# Patient Record
Sex: FEMALE
[De-identification: names, ages, dates, MRNs, and addresses within clinical notes are randomized per-mention and may not be internally consistent; named-entity substitution may affect disease eponyms.]

---

## 2020-04-17 ENCOUNTER — HOSPITAL ENCOUNTER (OUTPATIENT)
Dept: HOSPITAL 92 - L&D/OP | Age: 25
LOS: 1 days | Discharge: HOME | End: 2020-04-18
Attending: OBSTETRICS & GYNECOLOGY
Payer: SELF-PAY

## 2020-04-17 DIAGNOSIS — O99.283: ICD-10-CM

## 2020-04-17 DIAGNOSIS — O36.8130: Primary | ICD-10-CM

## 2020-04-17 DIAGNOSIS — O24.415: ICD-10-CM

## 2020-04-17 DIAGNOSIS — E03.9: ICD-10-CM

## 2020-04-17 DIAGNOSIS — Z3A.37: ICD-10-CM

## 2020-04-17 DIAGNOSIS — Z79.899: ICD-10-CM

## 2020-04-18 NOTE — PDOC.FPROB
FMR OB H&P: HPI





- History of Present Illness


Chief Complaint: Decreased fetal movement


History of Present Illness: 


Pt is a 26 yo F  at 37.4 wks by LMP c/w 8.5 wk US who presents for 

decreased fetal movement. She says she has been feeling baby less since Tuesday 

of this week. She feels baby move in the mornings and evening, but little 

throughout the day. She says baby did not move at all tonight and so she got 

concerned and came in to be checked. She denies any LOF, contractions, bleeding

, or vaginal discharge.





She was seen for an US where the they found her to have a normal NST. She was 

told to come to the hospital if she felt like she had continued decrease in 

fetal movement.


Primary Care Physician: 


DARRYL Rehman





FMR OB H&P: Current Pregnancy





- Prenatal Care


: 3


Para: 


Gestational age: 37.4


Due date: 2020


Dating Criteria: LMP c/w with 8.5 wk US


Course/Complications: 


Gestational Diabetes





- OB Labs


Blood type: O


RH: positive


Antibody Screen: negative


HIV: negative


RPR: negative


HepBsAg: negative


Rubella: immune


Quad screen: negative


Gonorrhea: negative


Chlamydia: negative


3 hour GTT: 111, 211, 170


A1c: 5.3


H&H: 11.9/34.5


Platelets: 223





FMR OB H&P: History





- Past Medical History


PMH: 


Hypothyroidism





- OB History


OB History: 


Gestational Diabetes treated with metformin.


Hx of gestational hypertension in previous pregnancy.


2 prior pregnancies by  @ term- 1 M, 1 F





- GYN History


GYN History: 


NILM Pap 





- Surgical History


Sx History: 


None





- Social History


Social History: 


No alcohol, tobacco, or recreational drugs.





- Family History


Family History: 


Cousin with autism





FMR OB H&P: Medications





- Current


Home Medications: 


 











 Medication  Instructions  Recorded  Confirmed  Type


 


Prenatal Vitamin 1 tablet PO DAILY 06/29/15 07/19/15 History


 


Levothyroxine Sodium [Synthroid] 1 tab PO DAILY 20 History


 


metFORMIN [Glucophage] 2 tab PO DAILY 20 History











Allergies/Adverse Reactions: 


 Allergies











Allergy/AdvReac Type Severity Reaction Status Date / Time


 


No Known Allergies Allergy   Verified 06/29/15 22:32














FMR OB H&P: ROS





- Review of Systems


General: denies: fever/chills


Eyes: denies: vision changes


ENT: denies: nasal congestion, rhinorrhea, sore throat


Cardiovascular: denies: chest pain, palpitation, edema


Respiratory: denies: cough, congestion, shortness of breath


Gastrointestinal: reports: nausea.  denies: abdominal pain, cramping, vomiting, 

diarrhea


Genitourinary (Female): denies: dysuria, vaginal discharge, vaginal bleeding, 

contractions


Musculoskeletal: denies: pain, tenderness, swelling


Neurologic: reports: other (Dizziness that resolved spontaneously).  denies: 

numbness, weakness


Integumentary: denies: itching, rash, lesions


Hematologic/Lymphatic: denies: prolonged or excessive bleeding, enlarged lymph 

nodes





FMR OB H&P: Vital Signs





- Maternal


Vital signs: 


BP: 119/64, HR: 80, O2: 99% on RA, T: 





- Fetal Heart Tones


Variability: moderate


Acceleration: present


Deceleration: absent


Category: category 1





FMR OB H&P: Physical Exam





- Physical Exam


General: NAD


HEENT: normocephalic and atraumatic, PERRLA, EOMI, MMM, conjunctiva clear, no 

scleral icterus, normal nasal mucosa, oropharynx clear


Neck: supple, trachea midline, no LAD


Heart: RRR, normal S1/S2, no murmurs/rubs/gallops, pulses present, no edema


General: CTAB, no respiratory distress, good air movement, no rales/rhonchi, no 

wheezing, no retractions


Abdomen: soft, gravid, non-tender, bowel sound present


Musculoskeletal: normal gait and station, pulses present, FROM in all four 

extremities


Neurological: cranial nerves II through XII intact, no focal deficit


Skin: no rash, good tugor


Lymphatic: no unusual bruising or bleeding, no purpura, no petechia, no LAD


Psychiatric: normal mood and affect





FMR OB H&P: A/P





- Problem List


(1) Term pregnancy


Status: Acute   Code(s): Z34.90 - ENCNTR FOR SUPRVSN OF NORMAL PREGNANCY, UNSP, 

UNSP TRIMESTER   





(2) Decreased fetal movement


Status: Acute   Code(s): O36.8190 - DECREASED FETAL MOVEMENTS, UNSP TRIMESTER, 

UNSP   


Disposition: 


Pt is a 26 yo F  at 37.4 wks by LMP c/w 8.5 wk US who presents for 

decreased fetal movement.





1. Decreased Fetal Movement


Pt was evaluated on NST and was found to be reactive


* Discussed with patient return precautions and if she has concerns to return 

to the hospital for evaluation.





2. Term Pregnancy


37.4 wks


* Hx of gestational HTN with past pregnancy and took medication for a year after


 * BP well controlled here


* Hypothyroidism on levothyroxine that is well controlled throughout pregnancy


* A2GDM currently on metformin


* GBS collected but not in records





Dispo: Discharged home. Discussed return precautions. Recommend following up at 

[facility] at scheduled visit on Thursday of next week.





Discussion: 


Date/Time: 20 0043











This H&P was discussed with  [] and  [] who agree with the above 

documentation and plan.








Addendum - Attending





- Attending Attestation


Date/Time: 20 2622





I personally evaluated the patient and discussed the management with Dr. Oneal.


I agree with the History, Examination, Assessment and Plan documented above.

## 2020-04-29 ENCOUNTER — HOSPITAL ENCOUNTER (INPATIENT)
Dept: HOSPITAL 92 - L&D | Age: 25
LOS: 2 days | Discharge: HOME | End: 2020-05-01
Attending: FAMILY MEDICINE | Admitting: FAMILY MEDICINE
Payer: SELF-PAY

## 2020-04-29 VITALS — BODY MASS INDEX: 46.1 KG/M2

## 2020-04-29 DIAGNOSIS — Z3A.39: ICD-10-CM

## 2020-04-29 DIAGNOSIS — Z79.899: ICD-10-CM

## 2020-04-29 DIAGNOSIS — E66.9: ICD-10-CM

## 2020-04-29 DIAGNOSIS — Z79.890: ICD-10-CM

## 2020-04-29 DIAGNOSIS — Z86.19: ICD-10-CM

## 2020-04-29 DIAGNOSIS — Z87.440: ICD-10-CM

## 2020-04-29 DIAGNOSIS — E03.9: ICD-10-CM

## 2020-04-29 LAB
HBSAG INDEX: 0.14 S/CO (ref 0–0.99)
HGB BLD-MCNC: 13.7 G/DL (ref 12–16)
MCH RBC QN AUTO: 32.8 PG (ref 27–31)
MCV RBC AUTO: 94.6 FL (ref 78–98)
PLATELET # BLD AUTO: 236 THOU/UL (ref 130–400)
RBC # BLD AUTO: 4.18 MILL/UL (ref 4.2–5.4)
SYPHILIS ANTIBODY INDEX: 0.1 S/CO
WBC # BLD AUTO: 8.9 THOU/UL (ref 4.8–10.8)

## 2020-04-29 PROCEDURE — 36415 COLL VENOUS BLD VENIPUNCTURE: CPT

## 2020-04-29 PROCEDURE — 87340 HEPATITIS B SURFACE AG IA: CPT

## 2020-04-29 PROCEDURE — 86850 RBC ANTIBODY SCREEN: CPT

## 2020-04-29 PROCEDURE — 86901 BLOOD TYPING SEROLOGIC RH(D): CPT

## 2020-04-29 PROCEDURE — 85027 COMPLETE CBC AUTOMATED: CPT

## 2020-04-29 PROCEDURE — 3E0P7VZ INTRODUCTION OF HORMONE INTO FEMALE REPRODUCTIVE, VIA NATURAL OR ARTIFICIAL OPENING: ICD-10-PCS | Performed by: FAMILY MEDICINE

## 2020-04-29 PROCEDURE — 86780 TREPONEMA PALLIDUM: CPT

## 2020-04-29 PROCEDURE — 86900 BLOOD TYPING SEROLOGIC ABO: CPT

## 2020-04-29 PROCEDURE — 36416 COLLJ CAPILLARY BLOOD SPEC: CPT

## 2020-04-29 NOTE — PDOC.FPROB
FMR OB H&P: HPI





- History of Present Illness


Chief Complaint: IOL


Indentification: 


History of Present Illness: 


Patient is a 26 y/o   female at 39.1W EGA by LMP, c/w 9.2W US, 

who presents to L&D for induction of labor. Patient reports no problems at this 

time other that some mild nausea and back pain, and states that her pregnancy 

has only been complicated by 1 episode of UTI and 1 episode of vaginal 

candidiasis. Records were reviewed from [facility], which demonstrated that patient 

has had adequate prenatal care and appropriate supervision of Hypothyroidism 

and A2GDM. Patient states that she checks her POC Glucose regularly at home, 

and upon review her POC Glucose readings were found to range from the high 90s 

to the low 110s. 





Patient denies any CTX, loss of fluid, vaginal bleeding, loss of fetal movements

, fevers, chills, headaches, visual disturbances, auditory disturbances, 

vomiting, CP, peripheral edema, SOB, cough, RUQ pain, dysuria, hematuria, 

bloody stools, arthralgias/arthritis, or recent falls/trauma. Patient denies 

sick contacts and recent travel.





Patient's  was present in the room at the time of evaluation.


Primary Care Physician: 


[facility] - Dr. Rachel Rehman / Dr. Jeanette Garcia





FMR OB H&P: Current Pregnancy





- Prenatal Care


: 3


Para: 


Gestational age: 39.1


Due date: 20


Dating Criteria: See HPI


Course/Complications: 


See HPI





- OB Labs


Blood type: O


RH: positive


Antibody Screen: negative


HIV: negative


RPR: negative


HepBsAg: negative


Rubella: immune


Urine drug screen: not done


Gonorrhea: negative


Chlamydia: negative


A1c: 5.3


GBS: negative


H&H: Pending


Platelets: Pending





FMR OB H&P: History





- Past Medical History


PMH: 


Hypothyroidism, A2GDM, Obesity





- OB History


OB History: 


 x2 - no complications noted





- GYN History


GYN History: 


Non-contributory.





- Surgical History


Sx History: 


None.





- Social History


Social History: 


Denies x3. Patient is unemployed at this time.





- Family History


Family History: 


Non-contributory.





FMR OB H&P: Medications





- Current


Home Medications: 


 











 Medication  Instructions  Recorded  Confirmed  Type


 


Prenatal Vitamin 1 tablet PO DAILY 06/29/15 04/29/20 History


 


Levothyroxine Sodium [Synthroid] 1 tab PO DAILY 20 History


 


metFORMIN [Glucophage] 2 tab PO DAILY 20 History











Allergies/Adverse Reactions: 


 Allergies











Allergy/AdvReac Type Severity Reaction Status Date / Time


 


No Known Allergies Allergy   Verified 06/29/15 22:32














FMR OB H&P: ROS





- Review of Systems


General: reports: fatigue.  denies: fever/chills, recent trauma


Eyes: denies: vision changes


ENT: denies: sore throat


Cardiovascular: denies: chest pain, edema


Respiratory: denies: cough, shortness of breath


Gastrointestinal: reports: nausea.  denies: abdominal pain, vomiting, diarrhea, 

constipation, bright red blood, dark black tarry stools


Genitourinary (Female): reports: vaginal discharge.  denies: dysuria, hematuria

, vaginal pain, vaginal bleeding, contractions


Musculoskeletal: denies: arthritis/arthralgias


Neurologic: denies: headache





FMR OB H&P: Vital Signs





- Maternal


Vital signs: 


HR (85), BP (126/74), T(98.5), 02Sat(98% - Room Air)





- Fetal Heart Tones


Baseline: 140


Variability: moderate


Acceleration: present


Deceleration: variable (x1)





FMR OB H&P: Physical Exam





- Physical Exam


General: NAD, awake, alert and oriented


HEENT: normocephalic and atraumatic, PERRLA, EOMI, conjunctiva clear, no 

scleral icterus, grossly normal vision, grossly normal hearing


Neck: supple, FROM, trachea midline, no LAD


Chest: non-tender to palpation, no lesions


Breast: symmetric


Heart: RRR, normal S1/S2, no murmurs/rubs/gallops, pulses present, no edema


General: CTAB, no respiratory distress, good air movement, no rales/rhonchi, no 

wheezing, no retractions


Abdomen: fundus(cm), non-tender


Musculoskeletal: pulses present, FROM in all four extremities, no misalignment/

asymmetry, no atrophy


Neurological: sensation to pain,touch and proprioception grossly normal


Skin: no rash, good tugor, no jaundice


Lymphatic: no unusual bruising or bleeding, no purpura, no petechia, no LAD


Psychiatric: intact recent and remote memory, good judgement and insight, 

normal mood and affect





- Pelvic Exam


Vulva: no masses, no lesions


Cervix: no masses, no blood


SVE: 2.5 / 50 / High


Piper score: 5


Membranes: Intact


Fetal Presentation: Vertex





FMR OB H&P: A/P





- Problem List


(1) Gestational diabetes


Current Visit: Yes   Status: Acute   Code(s): O24.419 - GESTATIONAL DIABETES 

MELLITUS IN PREGNANCY, UNSP CONTROL   





(2) Hypothyroidism


Current Visit: Yes   Status: Acute   Code(s): E03.9 - HYPOTHYROIDISM, 

UNSPECIFIED   





(3) Obesity


Current Visit: Yes   Status: Acute   Code(s): E66.9 - OBESITY, UNSPECIFIED   





(4) Term pregnancy


Current Visit: No   Status: Acute   Code(s): Z34.90 - ENCNTR FOR SUPRVSN OF 

NORMAL PREGNANCY, UNSP, UNSP TRIMESTER   


Disposition: 


Patient is a 26 y/o   female at 39.1W EGA by LMP, c/w 1st 

Trimester US, who presents to L&D for IOL.





1. SIUP


-Patient does not endorse loss of fluids, vaginal bleeding, CTX or loss of 

fetal movement at this time


-Pregnancy complicated by 1 UTI and 1 episodes of Vaginal Candiasis - no  

complaints at this time


-GBS: Negative


-HIV / RPR / GC / CT: Negative


-Maternal VSS w/ unremarkable physical exam


-FHTs in the 140s w/ Acels present, 1 Variable Decel noted during intake - will 

continue to monitor


-SVE(): 2.5 / 50 / High


-Piper Score: 5


-Will plan for Cytotec Induction w/ Q4H SVEs


-LR @ 125 ml/hr


-No epidural desired - ensure adequate pain control





2. A2GDM


-HgA1c: 5.3


-POC Glucose appears to be WNL w/ home readings


-Will hold Metformin 500 mg PO BID at this time


-Q2H Accuchecks


-Hypoglycemia Protocol





3. Hypothyroidism


-Adequate monitoring throughout pregnancy


-TSH: 4.0 > 2.3 > 1.72 > 3.18


-Patient does not endorse symptoms consistent w/ Hypo- / Hyperthyroidism


-Will continue home Levothyroxine regimen





4. Obesity


-Most recent US (34.3W) reviewed prior to admission


-HADLOCK: 78%


-EFW: 2456 g


-Low concern for Fetal Macrosomia at this time





PCP: [facility] - Dr. Rehman / Dr. Garcia





Dispo: Patient is currently stable and admitted to L&D for IOL at 39.1W EGA. 

Plan for Cytotec Induction w/ Q4H SVEs. Monitor Q2H Accuchecks and ensure 

adequate pain control. Expected LOS > 48H.


Discussion: 


Date/Time: 20











This H&P was discussed with  [] and  [] who agree with the above 

documentation and plan.








Addendum - Attending





- Attending Attestation


Date/Time: 20





I personally evaluated the patient and discussed the management with Dr. Reddy at time of admission.


I agree with the History, Examination, Assessment and Plan documented above 

with any addition or exceptions noted below.

## 2020-04-30 PROCEDURE — 0HQ9XZZ REPAIR PERINEUM SKIN, EXTERNAL APPROACH: ICD-10-PCS | Performed by: FAMILY MEDICINE

## 2020-04-30 PROCEDURE — 10907ZC DRAINAGE OF AMNIOTIC FLUID, THERAPEUTIC FROM PRODUCTS OF CONCEPTION, VIA NATURAL OR ARTIFICIAL OPENING: ICD-10-PCS | Performed by: FAMILY MEDICINE

## 2020-04-30 RX ADMIN — DOCUSATE CALCIUM SCH MG: 240 CAPSULE, LIQUID FILLED ORAL at 21:15

## 2020-04-30 RX ADMIN — Medication ONE MLS: at 10:18

## 2020-04-30 NOTE — PDOC.OBLPN
FMR OB Labor PN: Subj





- Interval History


Hospital Day: 1


Chief Complaint: IOL


Indentification:  @ 39.2W EGA by LMP, c/w 1st Trimester US


Interval History: Increasingly painful CTX





FMR OB Labor PN: Obj





- Maternal


Vital signs: 


BP: [123/72]  HR: [63] RR: [] Tmax: [] Pox: []% on []  Wt: []   








FMR OB Labor PN: Exam





- Physical Exam


General: NAD, awake, alert and oriented, other (Increasingly painful CTX)


HEENT: normocephalic and atraumatic, EOMI, MMM, conjunctiva clear, no scleral 

icterus, grossly normal vision, grossly normal hearing


Neck: supple, FROM


Breast: symmetric


Heart: no edema


General: no respiratory distress


Abdomen: gravid, non-tender


Musculoskeletal: FROM in all four extremities, no misalignment/asymmetry, no 

atrophy


Neurological: sensation to pain,touch and proprioception grossly normal


Skin: no rash, no jaundice


Lymphatic: no unusual bruising or bleeding, no purpura, no petechia


Psychiatric: intact recent and remote memory, good judgement and insight, 

normal mood and affect





- Pelvic Exam


Cervix: no masses, no blood


SVE: 4/50/-2


Piper score: 7


Membranes: Intact


Fetal Presentation: Vertex





FMR OB Labor PN: Data





- Labs


Lab results: 


 Laboratory Results - last 24 hr











  20





  20:54 21:09 21:09


 


WBC   


 


RBC   


 


Hgb   


 


Hct   


 


MCV   


 


MCH   


 


MCHC   


 


RDW   


 


Plt Count   


 


MPV   


 


POC Glucose  96  


 


Syphilis IgG/IgM Ab    Nonreactive


 


Hep Bs Antigen   Non-Reactive 


 


Blood Type   


 


Antibody Screen   














  20





  21:09 21:09 00:17


 


WBC   8.9 


 


RBC   4.18 L 


 


Hgb   13.7 


 


Hct   39.6 


 


MCV   94.6 


 


MCH   32.8 H 


 


MCHC   34.7 


 


RDW   12.7 


 


Plt Count   236 


 


MPV   9.1 


 


POC Glucose    84


 


Syphilis IgG/IgM Ab   


 


Hep Bs Antigen   


 


Blood Type  O POSITIVE  


 


Antibody Screen  NEGATIVE  














  20





  04:11


 


WBC 


 


RBC 


 


Hgb 


 


Hct 


 


MCV 


 


MCH 


 


MCHC 


 


RDW 


 


Plt Count 


 


MPV 


 


POC Glucose  81


 


Syphilis IgG/IgM Ab 


 


Hep Bs Antigen 


 


Blood Type 


 


Antibody Screen 














FMR OB Labor PN: A/P





- Problem List


(1) Gestational diabetes


Current Visit: Yes   Status: Acute   Code(s): O24.419 - GESTATIONAL DIABETES 

MELLITUS IN PREGNANCY, UNSP CONTROL   





(2) Hypothyroidism


Current Visit: Yes   Status: Acute   Code(s): E03.9 - HYPOTHYROIDISM, 

UNSPECIFIED   





(3) Obesity


Current Visit: Yes   Status: Acute   Code(s): E66.9 - OBESITY, UNSPECIFIED   





(4) Term pregnancy


Current Visit: No   Status: Acute   Code(s): Z34.90 - ENCNTR FOR SUPRVSN OF 

NORMAL PREGNANCY, UNSP, UNSP TRIMESTER   


Disposition: 


Patient is a 26 y/o   female at 39.2W EGA by LMP, c/w 1st 

Trimester US, who presents to L&D for IOL.





***0345 SVE***





1. SIUP


-Patient does not endorse loss of fluids, vaginal bleeding, CTX or loss of 

fetal movement at this time


-Pregnancy complicated by 1 UTI and 1 episodes of Vaginal Candiasis - no  

complaints at this time


-GBS: Negative


-HIV / RPR / GC / CT: Negative


-Maternal VSS w/ unremarkable physical exam


-FHTs in the 130s w/ Acels present, 1 Variable Decel noted during intake - will 

continue to monitor


-SVE(): 2.5 / 50 / High


-Cytotec #1 placed at 2115


-SVE(0115): 3 / 50 / -3


-SVE(034): 4/ 50 / -2


-Piper Score: 7


-Will repeat SVE in 4H to assess for Cytotec #2 Placement


-LR @ 125 ml/hr


-No epidural desired - will administer Stadol 2 mg IVP x1 at this time





2. A2GDM


-HgA1c: 5.3


-POC Glucose appears to be WNL w/ home readings


-Will hold Metformin 500 mg PO BID at this time


-Q2H Accuchecks - all values WNL since admission


-Hypoglycemia Protocol





3. Hypothyroidism


-Adequate monitoring throughout pregnancy


-TSH: 4.0 > 2.3 > 1.72 > 3.18


-Patient does not endorse symptoms consistent w/ Hypo- / Hyperthyroidism


-Will continue home Levothyroxine regimen





4. Obesity


-Most recent US (34.3W) reviewed prior to admission


-HADLOCK: 78%


-EFW: 2456 g


-Low concern for Fetal Macrosomia at this time





PCP: [facility] - Dr. Rehman / Dr. Garcia





Dispo: Patient is currently stable and admitted to L&D for IOL at 39.1W EGA. 

Continue with Cytotec IOL and plan for next SVE in 4H. Monitor Q2H Accuchecks 

and ensure adequate pain control as per above. Expected LOS > 48H.


Discussion: 


Date/Time: 20 0436











This H&P was discussed with  [] and  [] who agree with the above 

documentation and plan.

## 2020-04-30 NOTE — PDOC.OBLPN
FMR OB Labor PN: Subj





- Interval History


Hospital Day: 1


Chief Complaint: IOL


Indentification:  @ 39.2W EGA by LMP, c/w 1st Trimester US


Interval History: None





FMR OB Labor PN: Obj





- Maternal


Vital signs: 


BP: [129/63]  HR: [63] RR: [] Tmax: [] Pox: []% on []  Wt: []   








FMR OB Labor PN: Exam





- Physical Exam


General: NAD, awake, alert and oriented


HEENT: normocephalic and atraumatic, EOMI, MMM, conjunctiva clear, no scleral 

icterus, grossly normal vision, grossly normal hearing


Neck: supple, FROM


Breast: symmetric


Abdomen: gravid, non-tender


Musculoskeletal: FROM in all four extremities, no misalignment/asymmetry, no 

atrophy


Neurological: sensation to pain,touch and proprioception grossly normal


Skin: no rash, no jaundice


Lymphatic: no unusual bruising or bleeding, no purpura, no petechia


Psychiatric: intact recent and remote memory, good judgement and insight, 

normal mood and affect





- Pelvic Exam


SVE: 3/50/-3


Piper score: 5


Membranes: Intact


Fetal Presentation: Vertex





FMR OB Labor PN: Data





- Labs


Lab results: 


 Laboratory Results - last 24 hr











  20





  20:54 21:09 21:09


 


WBC   


 


RBC   


 


Hgb   


 


Hct   


 


MCV   


 


MCH   


 


MCHC   


 


RDW   


 


Plt Count   


 


MPV   


 


POC Glucose  96  


 


Syphilis IgG/IgM Ab    Nonreactive


 


Hep Bs Antigen   Non-Reactive 


 


Blood Type   


 


Antibody Screen   














  20





  21:09 21:09 00:17


 


WBC   8.9 


 


RBC   4.18 L 


 


Hgb   13.7 


 


Hct   39.6 


 


MCV   94.6 


 


MCH   32.8 H 


 


MCHC   34.7 


 


RDW   12.7 


 


Plt Count   236 


 


MPV   9.1 


 


POC Glucose    84


 


Syphilis IgG/IgM Ab   


 


Hep Bs Antigen   


 


Blood Type  O POSITIVE  


 


Antibody Screen  NEGATIVE  














FMR OB Labor PN: A/P





- Problem List


(1) Gestational diabetes


Current Visit: Yes   Status: Acute   Code(s): O24.419 - GESTATIONAL DIABETES 

MELLITUS IN PREGNANCY, UNSP CONTROL   





(2) Hypothyroidism


Current Visit: Yes   Status: Acute   Code(s): E03.9 - HYPOTHYROIDISM, 

UNSPECIFIED   





(3) Obesity


Current Visit: Yes   Status: Acute   Code(s): E66.9 - OBESITY, UNSPECIFIED   





(4) Term pregnancy


Current Visit: No   Status: Acute   Code(s): Z34.90 - ENCNTR FOR SUPRVSN OF 

NORMAL PREGNANCY, UNSP, UNSP TRIMESTER   


Disposition: 


Patient is a 24 y/o   female at 39.1W EGA by LMP, c/w 1st 

Trimester US, who presents to L&D for IOL.





***0115 SVE***





1. SIUP


-Patient does not endorse loss of fluids, vaginal bleeding, CTX or loss of 

fetal movement at this time


-Pregnancy complicated by 1 UTI and 1 episodes of Vaginal Candiasis - no  

complaints at this time


-GBS: Negative


-HIV / RPR / GC / CT: Negative


-Maternal VSS w/ unremarkable physical exam


-FHTs in the 140s w/ Acels present, 1 Variable Decel noted during intake - will 

continue to monitor


-SVE(): 2.5 / 50 / High


-Cytotec #1 placed at 


-SVE(115): 3 / 50 / -3


-Piper Score: 5


-Will repeat SVE in 2H to assess for Cytotec # Placement


-LR @ 125 ml/hr


-No epidural desired - ensure adequate pain control





2. A2GDM


-HgA1c: 5.3


-POC Glucose appears to be WNL w/ home readings


-Will hold Metformin 500 mg PO BID at this time


-Q2H Accuchecks - all values WNL since admission


-Hypoglycemia Protocol





3. Hypothyroidism


-Adequate monitoring throughout pregnancy


-TSH: 4.0 > 2.3 > 1.72 > 3.18


-Patient does not endorse symptoms consistent w/ Hypo- / Hyperthyroidism


-Will continue home Levothyroxine regimen





4. Obesity


-Most recent US (34.3W) reviewed prior to admission


-HADLOCK: 78%


-EFW: 2456 g


-Low concern for Fetal Macrosomia at this time





PCP: [facility] - Dr. Rehman / Dr. Garcia





Dispo: Patient is currently stable and admitted to L&D for IOL at 39.1W EGA. 

Continue with Cytotec IOL and plan for additional SVE in 2H. Monitor Q2H 

Accuchecks and ensure adequate pain control as per above. Expected LOS > 48H.


Discussion: 


Date/Time: 20 3304











This H&P was discussed with  [] and  [] who agree with the above 

documentation and plan.

## 2020-04-30 NOTE — PDOC.OPDEL
OB Operative/Delivery Note





- Additional Findings/Plan


Compilations/Other Findings: 





Vaginal Delivery Dictation Guideline 


Delivering Physician: Dr. Rachel Rehman, Dr. Marilia Almaraz


Attending: Dr. Yemi Coppola


Procedure: Spontaneous Vaginal Delivery 


Anesthesia: epidural, Local for Repair 


EBL: 175ml ml 


Pre-op Diagnosis: 


1. Medical induction of term intrauterine pregnancy for A2GDM 


2. A2GDM on metformin


3. Hx of Hypothyroidism 


Post-op Diagnosis: 


1. Term intrauterine pregnancy, delivered 


2. same as above 


Indications: A 26y/o female  presents to L&D for induction due to A2GDM 


Delivery Note: This is 26yo F  @ 39.2wks who delivered a viable M infant 

at 0859. Following an uneventful intrapartum course, that included q2h 

accuchecks all wnl, and AROM with fetal scalp electrode placement ~30min prior 

to delivery, a vigorous male was delivered over an intact perineum in the 

occipitoanterior position. Anterior Shoulder and then remainder of the body 

delivered. Nuchal cord x 1. The head was held down and mouth and nares were 

bulb suctioned. Cord clamped and cut and cord blood collected. Placenta 

delivered intact in the Ngo presentation with a 3 vessel cord noted. Fundal 

massage was performed and the fundus was firm. The cervix and vagina were 

inspected and a 1st degree laceration noted and repaired with 3-0 vicryl in the 

usual fashion with good approximation and hemostasis after 10ml lidocaine was 

injected at site. Infant went to  nursery in good condition for routine 

care. Apgars were 8/9 at 1 & 5 minutes, respectively. Patient tolerated 

delivery well and went to postpartum after routine recovery/care. 





Addendum - Attending





- Attending Attestation


Date/Time: 20 1027





I was present for the delivery.

## 2020-05-01 VITALS — DIASTOLIC BLOOD PRESSURE: 66 MMHG | TEMPERATURE: 97.9 F | SYSTOLIC BLOOD PRESSURE: 112 MMHG

## 2020-05-01 LAB
HGB BLD-MCNC: 12.6 G/DL (ref 12–16)
MCH RBC QN AUTO: 32.3 PG (ref 27–31)
MCV RBC AUTO: 95.9 FL (ref 78–98)
PLATELET # BLD AUTO: 198 THOU/UL (ref 130–400)
RBC # BLD AUTO: 3.89 MILL/UL (ref 4.2–5.4)
WBC # BLD AUTO: 9.3 THOU/UL (ref 4.8–10.8)

## 2020-05-01 RX ADMIN — DOCUSATE CALCIUM SCH MG: 240 CAPSULE, LIQUID FILLED ORAL at 09:02

## 2020-05-01 NOTE — [email or web address].OBPPN
FMR OB Postpartum PN: Subj





- Interval History


Hospital Day: 2


Postpartum Day: 





1


Chief Complaint: none


Indentification: 25F >3 delivered TIFFANY SANCHEZ via  @ 39.2wga on  @ 0859


Interval History: VSS, tolerating PO, ambulating, voiding/passing flatus





FMR OB Postpartum PN: Obj





- Maternal


Vital signs: 


BP: [106-136/64-74]  HR: [62-74] RR: [16] Tmax: [98.6F] Pox: [99]% on [RA]  Wt: 

[129.727kg]   








- Urine output


I&O: 


 











 20





 06:59 06:59 06:59


 


Output Total   250


 


Balance   -250














- Lochia


Lochia: 





mild





- Pain Management


Intervention: oral medication





FMR OB Postpartum PN: Exam





- Physical Exam


General: NAD, awake, alert and oriented


HEENT: normocephalic and atraumatic, MMM


Neck: supple, FROM


Chest: non-tender to palpation, no lesions


Heart: RRR, normal S1/S2


General: CTAB, no respiratory distress


Abdomen: soft, bowel sound present, no masses


Musculoskeletal: pulses present, FROM in all four extremities


Neurological: no tremor, no focal deficit


Skin: no rash, good tugor


Postpartum: no edema, appropriately tender


Lymphatic: no unusual bruising or bleeding, no purpura


Psychiatric: intact recent and remote memory, good judgement and insight





FMR OB Postpartum PN: Data





- Labs


Lab results: 


 Laboratory Results - last 24 hr











  20





  07:22 05:00


 


WBC   9.3


 


RBC   3.89 L


 


Hgb   12.6


 


Hct   37.3


 


MCV   95.9


 


MCH   32.3 H


 


MCHC   33.7


 


RDW   12.6


 


Plt Count   198


 


MPV   8.3


 


POC Glucose  82 














FMR OB Postpartum PN: A/P





- Problem List


(1) Postpartum care and examination


Current Visit: Yes   Status: Acute   Code(s): Z39.2 - ENCOUNTER FOR ROUTINE 

POSTPARTUM FOLLOW-UP   





(2) Gestational diabetes


Current Visit: Yes   Status: Resolved   Code(s): O24.419 - GESTATIONAL DIABETES 

MELLITUS IN PREGNANCY, UNSP CONTROL   





(3) Hypothyroidism


Current Visit: Yes   Status: Chronic   Code(s): E03.9 - HYPOTHYROIDISM, 

UNSPECIFIED   


Disposition: 





Patient is a 25F  @ 39.2wga that delivered a TAGA LAURA via  on  @ 

0859





#Postpartum Day 1


-mild lochia


-passing flatus, has had a BM since delivery


-reporting mild vaginal pain, abdomen slightly ttp


-tolerating PO


-ambulating


-plans to f/u at [facility] @ 2 and 6wks


-eager to go home to her other children





#Hx of A2GDM


-hold metformin


-stop accuchecks


-encourage patient to have glucose testing at 6wk pp visit





Dispo: post-op day 1 status post  @ 0859 on . Possible d/c today pending 

baby's bili


Discussion: 


Date/Time: 20 0604








Signature: 





Marilia Almaraz MD, PGY-1





Addendum - Attending





- Attending Attestation


Date/Time: 20 1006





I personally evaluated the patient and discussed the management with Dr. Almaraz.


I agree with the History, Examination, Assessment and Plan documented above 

with any addition or exceptions noted below.

## 2021-12-13 ENCOUNTER — HOSPITAL ENCOUNTER (EMERGENCY)
Dept: HOSPITAL 92 - CSHERS | Age: 26
LOS: 1 days | Discharge: HOME | End: 2021-12-14
Payer: SELF-PAY

## 2021-12-13 DIAGNOSIS — Z3A.01: ICD-10-CM

## 2021-12-13 DIAGNOSIS — O20.0: Primary | ICD-10-CM

## 2021-12-13 DIAGNOSIS — O10.011: ICD-10-CM

## 2021-12-13 PROCEDURE — 81003 URINALYSIS AUTO W/O SCOPE: CPT

## 2021-12-13 PROCEDURE — 84702 CHORIONIC GONADOTROPIN TEST: CPT

## 2021-12-13 PROCEDURE — 76856 US EXAM PELVIC COMPLETE: CPT

## 2021-12-13 PROCEDURE — 86901 BLOOD TYPING SEROLOGIC RH(D): CPT

## 2021-12-13 PROCEDURE — 81015 MICROSCOPIC EXAM OF URINE: CPT

## 2021-12-13 PROCEDURE — 85025 COMPLETE CBC W/AUTO DIFF WBC: CPT

## 2021-12-13 PROCEDURE — 86900 BLOOD TYPING SEROLOGIC ABO: CPT

## 2021-12-14 LAB
BASOPHILS # BLD AUTO: 0 10X3/UL (ref 0–0.2)
BASOPHILS NFR BLD AUTO: 0.4 % (ref 0–2)
EOSINOPHIL # BLD AUTO: 0.2 10X3/UL (ref 0–0.5)
EOSINOPHIL NFR BLD AUTO: 1.5 % (ref 0–6)
GLUCOSE UR STRIP-MCNC: >=1000 MG/DL
HGB BLD-MCNC: 13.8 G/DL (ref 12–15.5)
LEUKOCYTE ESTERASE UR QL STRIP.AUTO: 100
LYMPHOCYTES NFR BLD AUTO: 25.8 % (ref 18–47)
MCH RBC QN AUTO: 32.2 PG (ref 27–33)
MCV RBC AUTO: 91.8 FL (ref 81.6–98.3)
MONOCYTES # BLD AUTO: 0.7 10X3/UL (ref 0–1.1)
MONOCYTES NFR BLD AUTO: 6.6 % (ref 0–10)
NEUTROPHILS # BLD AUTO: 7.2 10X3/UL (ref 1.5–8.4)
NEUTROPHILS NFR BLD AUTO: 65.4 % (ref 40–75)
PLATELET # BLD AUTO: 293 10X3/UL (ref 150–450)
PROT UR STRIP.AUTO-MCNC: 30 MG/DL
RBC # BLD AUTO: 4.29 10X6/UL (ref 3.9–5.03)
RBC UR QL AUTO: (no result) HPF (ref 0–3)
SP GR UR STRIP: 1.01 (ref 1–1.04)
WBC # BLD AUTO: 11 10X3/UL (ref 3.5–10.5)
WBC UR QL AUTO: (no result) HPF (ref 0–3)

## 2022-01-12 ENCOUNTER — HOSPITAL ENCOUNTER (EMERGENCY)
Dept: HOSPITAL 92 - CSHERS | Age: 27
Discharge: HOME | End: 2022-01-12
Payer: COMMERCIAL

## 2022-01-12 DIAGNOSIS — O20.9: Primary | ICD-10-CM

## 2022-01-12 DIAGNOSIS — Z3A.01: ICD-10-CM

## 2022-01-12 DIAGNOSIS — O10.011: ICD-10-CM

## 2022-01-12 LAB
BACTERIA UR QL AUTO: (no result) HPF
BASOPHILS # BLD AUTO: 0 10X3/UL (ref 0–0.2)
BASOPHILS NFR BLD AUTO: 0.3 % (ref 0–2)
EOSINOPHIL # BLD AUTO: 0.2 10X3/UL (ref 0–0.5)
EOSINOPHIL NFR BLD AUTO: 1.4 % (ref 0–6)
HGB BLD-MCNC: 13.1 G/DL (ref 12–15.5)
LEUKOCYTE ESTERASE UR QL STRIP.AUTO: 500
LYMPHOCYTES NFR BLD AUTO: 24.7 % (ref 18–47)
MCH RBC QN AUTO: 31.3 PG (ref 27–33)
MCV RBC AUTO: 92.8 FL (ref 81.6–98.3)
MONOCYTES # BLD AUTO: 0.7 10X3/UL (ref 0–1.1)
MONOCYTES NFR BLD AUTO: 6.1 % (ref 0–10)
NEUTROPHILS # BLD AUTO: 7.9 10X3/UL (ref 1.5–8.4)
NEUTROPHILS NFR BLD AUTO: 67.1 % (ref 40–75)
PLATELET # BLD AUTO: 298 10X3/UL (ref 150–450)
RBC # BLD AUTO: 4.19 10X6/UL (ref 3.9–5.03)
RBC UR QL AUTO: (no result) HPF (ref 0–3)
SP GR UR STRIP: 1 (ref 1–1.04)
WBC # BLD AUTO: 11.7 10X3/UL (ref 3.5–10.5)

## 2022-01-12 PROCEDURE — 85025 COMPLETE CBC W/AUTO DIFF WBC: CPT

## 2022-01-12 PROCEDURE — 81003 URINALYSIS AUTO W/O SCOPE: CPT

## 2022-01-12 PROCEDURE — 84702 CHORIONIC GONADOTROPIN TEST: CPT

## 2022-01-12 PROCEDURE — 76856 US EXAM PELVIC COMPLETE: CPT

## 2022-01-12 PROCEDURE — 81015 MICROSCOPIC EXAM OF URINE: CPT

## 2022-01-12 PROCEDURE — 36415 COLL VENOUS BLD VENIPUNCTURE: CPT

## 2023-01-30 ENCOUNTER — HOSPITAL ENCOUNTER (EMERGENCY)
Dept: HOSPITAL 92 - ERS | Age: 28
Discharge: HOME | End: 2023-01-30
Payer: SELF-PAY

## 2023-01-30 DIAGNOSIS — R11.2: ICD-10-CM

## 2023-01-30 DIAGNOSIS — R10.13: Primary | ICD-10-CM

## 2023-01-30 DIAGNOSIS — I10: ICD-10-CM

## 2023-01-30 LAB
ALBUMIN SERPL BCG-MCNC: 3.8 G/DL (ref 3.5–5)
ALP SERPL-CCNC: 105 U/L (ref 40–110)
ALT SERPL W P-5'-P-CCNC: 18 U/L (ref 8–55)
ANION GAP SERPL CALC-SCNC: 13 MMOL/L (ref 10–20)
AST SERPL-CCNC: 21 U/L (ref 5–34)
BASOPHILS # BLD AUTO: 0.1 THOU/UL (ref 0–0.2)
BASOPHILS NFR BLD AUTO: 0.7 % (ref 0–1)
BILIRUB SERPL-MCNC: 0.2 MG/DL (ref 0.2–1.2)
BUN SERPL-MCNC: 6 MG/DL (ref 7–18.7)
CALCIUM SERPL-MCNC: 9.1 MG/DL (ref 7.8–10.44)
CHLORIDE SERPL-SCNC: 104 MMOL/L (ref 98–107)
CO2 SERPL-SCNC: 20 MMOL/L (ref 22–29)
CREAT CL PREDICTED SERPL C-G-VRATE: 0 ML/MIN (ref 70–130)
EOSINOPHIL # BLD AUTO: 0.1 THOU/UL (ref 0–0.7)
EOSINOPHIL NFR BLD AUTO: 1.1 % (ref 0–10)
GLOBULIN SER CALC-MCNC: 4.4 G/DL (ref 2.4–3.5)
GLUCOSE SERPL-MCNC: 236 MG/DL (ref 70–105)
HGB BLD-MCNC: 14.4 G/DL (ref 12–16)
LYMPHOCYTES # BLD: 2.5 THOU/UL (ref 1.2–3.4)
LYMPHOCYTES NFR BLD AUTO: 25.5 % (ref 21–51)
MCH RBC QN AUTO: 31.9 PG (ref 27–31)
MCV RBC AUTO: 91.2 FL (ref 78–98)
MONOCYTES # BLD AUTO: 0.6 THOU/UL (ref 0.11–0.59)
MONOCYTES NFR BLD AUTO: 5.6 % (ref 0–10)
NEUTROPHILS # BLD AUTO: 6.6 THOU/UL (ref 1.4–6.5)
NEUTROPHILS NFR BLD AUTO: 67.1 % (ref 42–75)
PLATELET # BLD AUTO: 271 10X3/UL (ref 130–400)
POTASSIUM SERPL-SCNC: 4 MMOL/L (ref 3.5–5.1)
PREGS CONTROL BACKGROUND?: (no result)
PREGS CONTROL BAR APPEAR?: YES
RBC # BLD AUTO: 4.51 MILL/UL (ref 4.2–5.4)
SODIUM SERPL-SCNC: 133 MMOL/L (ref 136–145)
WBC # BLD AUTO: 9.8 10X3/UL (ref 4.8–10.8)

## 2023-01-30 PROCEDURE — 84703 CHORIONIC GONADOTROPIN ASSAY: CPT

## 2023-01-30 PROCEDURE — 71045 X-RAY EXAM CHEST 1 VIEW: CPT

## 2023-01-30 PROCEDURE — 83690 ASSAY OF LIPASE: CPT

## 2023-01-30 PROCEDURE — 36415 COLL VENOUS BLD VENIPUNCTURE: CPT

## 2023-01-30 PROCEDURE — 93005 ELECTROCARDIOGRAM TRACING: CPT

## 2023-01-30 PROCEDURE — 85025 COMPLETE CBC W/AUTO DIFF WBC: CPT

## 2023-01-30 PROCEDURE — 80053 COMPREHEN METABOLIC PANEL: CPT

## 2023-01-30 PROCEDURE — 84484 ASSAY OF TROPONIN QUANT: CPT
